# Patient Record
Sex: FEMALE | Race: WHITE
[De-identification: names, ages, dates, MRNs, and addresses within clinical notes are randomized per-mention and may not be internally consistent; named-entity substitution may affect disease eponyms.]

---

## 2018-03-22 ENCOUNTER — HOSPITAL ENCOUNTER (INPATIENT)
Dept: HOSPITAL 12 - ER | Age: 53
LOS: 3 days | Discharge: NURSING, REHAB OR LONG-TERM CARE | DRG: 282 | End: 2018-03-25
Payer: COMMERCIAL

## 2018-03-22 VITALS — DIASTOLIC BLOOD PRESSURE: 71 MMHG | SYSTOLIC BLOOD PRESSURE: 92 MMHG

## 2018-03-22 VITALS — BODY MASS INDEX: 16.69 KG/M2 | WEIGHT: 85 LBS | HEIGHT: 60 IN

## 2018-03-22 DIAGNOSIS — K85.90: Primary | ICD-10-CM

## 2018-03-22 DIAGNOSIS — S06.9X0S: ICD-10-CM

## 2018-03-22 DIAGNOSIS — Z93.1: ICD-10-CM

## 2018-03-22 DIAGNOSIS — G93.40: ICD-10-CM
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
## 2018-03-22 DIAGNOSIS — R13.10: ICD-10-CM

## 2018-03-22 LAB
ALP SERPL-CCNC: 88 U/L (ref 50–136)
ALT SERPL W/O P-5'-P-CCNC: 62 U/L (ref 14–59)
AST SERPL-CCNC: 42 U/L (ref 15–37)
BASOPHILS # BLD AUTO: 0 K/UL (ref 0–8)
BASOPHILS NFR BLD AUTO: 0.7 % (ref 0–2)
BILIRUB DIRECT SERPL-MCNC: 0.2 MG/DL (ref 0–0.2)
BILIRUB SERPL-MCNC: 1 MG/DL (ref 0.2–1)
BUN SERPL-MCNC: 14 MG/DL (ref 7–18)
CHLORIDE SERPL-SCNC: 102 MMOL/L (ref 98–107)
CO2 SERPL-SCNC: 28 MMOL/L (ref 21–32)
CREAT SERPL-MCNC: 0.6 MG/DL (ref 0.6–1.3)
EOSINOPHIL # BLD AUTO: 0 K/UL (ref 0–0.7)
EOSINOPHIL NFR BLD AUTO: 0.6 % (ref 0–7)
GLUCOSE SERPL-MCNC: 132 MG/DL (ref 74–106)
HCT VFR BLD AUTO: 36.8 % (ref 31.2–41.9)
HGB BLD-MCNC: 12.4 G/DL (ref 10.9–14.3)
LIPASE SERPL-CCNC: 456 U/L (ref 73–393)
LYMPHOCYTES # BLD AUTO: 1.4 K/UL (ref 20–40)
LYMPHOCYTES NFR BLD AUTO: 31.9 % (ref 20.5–51.5)
MCH RBC QN AUTO: 31.5 UUG (ref 24.7–32.8)
MCHC RBC AUTO-ENTMCNC: 34 G/DL (ref 32.3–35.6)
MCV RBC AUTO: 93.6 FL (ref 75.5–95.3)
MONOCYTES # BLD AUTO: 0.4 K/UL (ref 2–10)
MONOCYTES NFR BLD AUTO: 8.5 % (ref 0–11)
NEUTROPHILS # BLD AUTO: 2.5 K/UL (ref 1.8–8.9)
NEUTROPHILS NFR BLD AUTO: 58.3 % (ref 38.5–71.5)
PLATELET # BLD AUTO: 191 K/UL (ref 179–408)
POTASSIUM SERPL-SCNC: 3.6 MMOL/L (ref 3.5–5.1)
RBC # BLD AUTO: 3.94 MIL/UL (ref 3.63–4.92)
WBC # BLD AUTO: 4.3 K/UL (ref 3.8–11.8)
WS STN SPEC: 7.7 G/DL (ref 6.4–8.2)

## 2018-03-22 PROCEDURE — A4663 DIALYSIS BLOOD PRESSURE CUFF: HCPCS

## 2018-03-22 PROCEDURE — C9113 INJ PANTOPRAZOLE SODIUM, VIA: HCPCS

## 2018-03-22 RX ADMIN — SODIUM CHLORIDE PRN MLS/HR: 0.9 INJECTION, SOLUTION INTRAVENOUS at 21:54

## 2018-03-22 NOTE — NUR
PT RECEIVED FROM ED, VIA [address]. A/OX1. NON-VERBAL. ORIENTED TO ROOM. V/S STABLE. SINUS 
RHYTHM 73 ON THE TELE MONITOR. IN NO ACUTE DISTRESS. NO S/S OF PAIN NOTED AT THIS TIME. IV 
INTACT AND PATENT, ON RFA AND RAC. ON RA, TOLERATING WELL. GTUBE INTACT AND PATENT, NO 
RESIDUAL NOTED. PT KEPT NPO. HOB ELEVATED. SAFETY MEASURES IMPLEMENTED. BED ALARM SET.  CALL 
LIGHT WITHIN REACH.

## 2018-03-23 VITALS — SYSTOLIC BLOOD PRESSURE: 92 MMHG | DIASTOLIC BLOOD PRESSURE: 62 MMHG

## 2018-03-23 VITALS — SYSTOLIC BLOOD PRESSURE: 92 MMHG | DIASTOLIC BLOOD PRESSURE: 51 MMHG

## 2018-03-23 VITALS — SYSTOLIC BLOOD PRESSURE: 91 MMHG | DIASTOLIC BLOOD PRESSURE: 58 MMHG

## 2018-03-23 VITALS — SYSTOLIC BLOOD PRESSURE: 92 MMHG | DIASTOLIC BLOOD PRESSURE: 53 MMHG

## 2018-03-23 VITALS — DIASTOLIC BLOOD PRESSURE: 54 MMHG | SYSTOLIC BLOOD PRESSURE: 99 MMHG

## 2018-03-23 VITALS — DIASTOLIC BLOOD PRESSURE: 52 MMHG | SYSTOLIC BLOOD PRESSURE: 99 MMHG

## 2018-03-23 LAB
ALP SERPL-CCNC: 76 U/L (ref 50–136)
ALT SERPL W/O P-5'-P-CCNC: 46 U/L (ref 14–59)
APPEARANCE UR: CLEAR
AST SERPL-CCNC: 23 U/L (ref 15–37)
BASOPHILS # BLD AUTO: 0 K/UL (ref 0–8)
BASOPHILS NFR BLD AUTO: 0.7 % (ref 0–2)
BILIRUB SERPL-MCNC: 1 MG/DL (ref 0.2–1)
BILIRUB UR QL STRIP: NEGATIVE
BUN SERPL-MCNC: 9 MG/DL (ref 7–18)
CHLORIDE SERPL-SCNC: 108 MMOL/L (ref 98–107)
CO2 SERPL-SCNC: 25 MMOL/L (ref 21–32)
COLOR UR: YELLOW
CREAT SERPL-MCNC: 0.6 MG/DL (ref 0.6–1.3)
DEPRECATED SQUAMOUS URNS QL MICRO: (no result) /HPF
EOSINOPHIL # BLD AUTO: 0.1 K/UL (ref 0–0.7)
EOSINOPHIL NFR BLD AUTO: 1 % (ref 0–7)
GLUCOSE SERPL-MCNC: 71 MG/DL (ref 74–106)
GLUCOSE UR STRIP-MCNC: NEGATIVE MG/DL
HCT VFR BLD AUTO: 34 % (ref 31.2–41.9)
HGB BLD-MCNC: 11.5 G/DL (ref 10.9–14.3)
HGB UR QL STRIP: NEGATIVE
KETONES UR STRIP-MCNC: (no result) MG/DL
LEUKOCYTE ESTERASE UR QL STRIP: NEGATIVE
LIPASE SERPL-CCNC: 389 U/L (ref 73–393)
LYMPHOCYTES # BLD AUTO: 2.4 K/UL (ref 20–40)
LYMPHOCYTES NFR BLD AUTO: 45.8 % (ref 20.5–51.5)
MAGNESIUM SERPL-MCNC: 2.1 MG/DL (ref 1.8–2.4)
MCH RBC QN AUTO: 31.8 UUG (ref 24.7–32.8)
MCHC RBC AUTO-ENTMCNC: 34 G/DL (ref 32.3–35.6)
MCV RBC AUTO: 94.4 FL (ref 75.5–95.3)
MONOCYTES # BLD AUTO: 0.3 K/UL (ref 2–10)
MONOCYTES NFR BLD AUTO: 5.7 % (ref 0–11)
NEUTROPHILS # BLD AUTO: 2.4 K/UL (ref 1.8–8.9)
NEUTROPHILS NFR BLD AUTO: 46.8 % (ref 38.5–71.5)
NITRITE UR QL STRIP: NEGATIVE
PH UR STRIP: 5.5 [PH] (ref 5–8)
PHOSPHATE SERPL-MCNC: 3.4 MG/DL (ref 2.5–4.9)
PLATELET # BLD AUTO: 159 K/UL (ref 179–408)
POTASSIUM SERPL-SCNC: 4 MMOL/L (ref 3.5–5.1)
PROT UR QL STRIP: NEGATIVE
RBC # BLD AUTO: 3.61 MIL/UL (ref 3.63–4.92)
SP GR UR STRIP: 1.01 (ref 1–1.03)
UROBILINOGEN UR STRIP-MCNC: 0.2 E.U./DL
WBC # BLD AUTO: 5.2 K/UL (ref 3.8–11.8)
WBC #/AREA URNS HPF: (no result) /HPF (ref 0–3)
WS STN SPEC: 6.5 G/DL (ref 6.4–8.2)

## 2018-03-23 RX ADMIN — SODIUM CHLORIDE PRN MLS/HR: 0.9 INJECTION, SOLUTION INTRAVENOUS at 18:42

## 2018-03-23 RX ADMIN — Medication PRN ML: at 18:42

## 2018-03-23 RX ADMIN — SODIUM CHLORIDE PRN MLS/HR: 0.9 INJECTION, SOLUTION INTRAVENOUS at 05:20

## 2018-03-23 NOTE — NUR
RECEIVED PATIENT AWAKE IN BED, SHE'S NON VERBAL AND UNABLE TO EXPRESS HER NEEDS BUT ABLE TO 
NOD WITH HER HEAD. PATIENT WITH NO S/S OF OF PAIN OR DISTRESS ON ASSESSMENT. SHE HAS A 
G-TUBE WHICH IS PATENT AND INTACT WITH NO RESIDUAL. G-TUBE SITE IS CLEAN WITH NO S/S OF 
INFECTION. SAFETY AND COMFORT MEASURES IN PLACE, CALL LIGHT LEFT WITHIN PATIENT'S REACH.

## 2018-03-23 NOTE — NUR
END OF SHIFT NOTES. PT SLEPT INTERMITTENTLY THROUGHOUT SHIFT. IN STABLE CONDITION. 58 SINUS 
ON THE TELE MONITOR. TOLERATED RA, WELL. AFEBRILE. GTUBE REMAINS INTACT AND PATENT, NO 
RESIDUAL NOTED. NPO MAINTAINED. HOB ELEVATED. ALL NEEDS ATTENDED. SAFETY MAINTAINED. CALL 
LIGHT WITHIN REACH.

## 2018-03-24 VITALS — SYSTOLIC BLOOD PRESSURE: 100 MMHG | DIASTOLIC BLOOD PRESSURE: 54 MMHG

## 2018-03-24 VITALS — DIASTOLIC BLOOD PRESSURE: 53 MMHG | SYSTOLIC BLOOD PRESSURE: 96 MMHG

## 2018-03-24 VITALS — DIASTOLIC BLOOD PRESSURE: 47 MMHG | SYSTOLIC BLOOD PRESSURE: 101 MMHG

## 2018-03-24 VITALS — SYSTOLIC BLOOD PRESSURE: 111 MMHG | DIASTOLIC BLOOD PRESSURE: 65 MMHG

## 2018-03-24 RX ADMIN — SODIUM CHLORIDE PRN MLS/HR: 0.9 INJECTION, SOLUTION INTRAVENOUS at 06:06

## 2018-03-24 RX ADMIN — SODIUM CHLORIDE PRN MLS/HR: 0.9 INJECTION, SOLUTION INTRAVENOUS at 23:27

## 2018-03-24 NOTE — NUR
HEMODYNAMIC STATUS STABLE  SAFETY MEASURE PROVIDED BED ALARM ON CALL LIGHT IN REACH    NO 
ACUTE DISTRESS

## 2018-03-24 NOTE — NUR
AWAKE NON VERBAL  NO SOB OR PAIN GT FEEDING WITH 1.5 NUTREN AT 40 ML/HR PIOTR WELL NO N/V HOB 
UP AT ALL TIME  ON ASPIRATION AND FALL PRECAUTION BED ALARM ON  AND CALL LIGHT IN REACH

## 2018-03-24 NOTE — NUR
RECEIVED PATIENT AWAKE IN BED. PATIENT IS ALERT TO SELF. APHASIC AND UNABLE TO SPEAK BUT 
PATIENT IS ABLE TO MAKE SIMPLE NEEDS KNOWN, FOR EXAMPLE, IF PATIENTS DIAPER IS WET, SHE WILL 
POINT TO THE DIAPER AND WHEN ASKED IF SHE NEEDS TO BE CHANGED, SHE WILL NOD HER HEAD YES. 
IVF INFUSING WELL TO RIGHT WRIST #22 GAUGE. GT FEEDING INFUSING WELL. ON ASPIRATION 
PRECAUTIONS. NO S/S OF ANY PAIN OR DISCOMFORT. NO FACIAL GRIMACE NOTED AND WHEN ASKED IF 
PAIN, PATIENT SHAKES HER HEAD NO. NO RESP. DISTRESS NOTED. BED ALARM ON. CALL LIGHT IN 
REACH. ALL NEEDS ATTENDED, WILL CONTINUE TO MONITOR.

## 2018-03-24 NOTE — NUR
FAMILY ELINOR WAS CALL AND ASK WHEN SHE WILL  PATIENT TO GO HOME TODAY   SHE STATE SHE 
WILL NOT TAKE CARE HER ANYMORE  AND WANT HER TO GO TO SNF INSTEAD OF GO HOME  CHARGE NURSE  
MONY WAS INFORM AND WILL F/U WITH  IN AM REGARDING D/C TO SNF

## 2018-03-24 NOTE — NUR
PATIENT SLEPT WELL THROUGH THE SHIFT. NO EVIDENCE OF PAIN OR DISTRESS NOTED ON THIS SHIFT.  
DRESSING TO G-TUBE SITE CHANGED. SAFETY AND COMFORT MEASURES IN PLACE. ASPIRATION 
PRECAUTIONS MAINTAINED AT ALL TIMES.NO SIGNIFICANT CHANGES IN STATUS, PATIENT RESTING 
COMFORTABLY, ALL NEEDS MET.

## 2018-03-25 VITALS — DIASTOLIC BLOOD PRESSURE: 61 MMHG | SYSTOLIC BLOOD PRESSURE: 98 MMHG

## 2018-03-25 VITALS — SYSTOLIC BLOOD PRESSURE: 90 MMHG | DIASTOLIC BLOOD PRESSURE: 49 MMHG

## 2018-03-25 RX ADMIN — SODIUM CHLORIDE PRN MLS/HR: 0.9 INJECTION, SOLUTION INTRAVENOUS at 05:41

## 2018-03-25 RX ADMIN — Medication PRN ML: at 05:51

## 2018-03-25 NOTE — NUR
D/C ORDERS RECEIVED NOTED AND CARRIED OUT,D/C INSTRUCTION AND EDUCATION GIVEN TO THE PT CARE 
GIVER FROM [facility],D/C [facility]YN PER MD ORDERS,PT LEFT THE FACILITY VIA PRIVATE CAR IN 
STABLE CONDITION,

## 2018-03-25 NOTE — NUR
PATIENT AWAKE IN BED. APPEARS RESTLESS AND UNABLE TO SLEEP. PATIENT GIVEN AMBIEN 5MG AND 
TYLENOL 650MG PRN VIA GT FOR SLEEP AND MILD DISCOMFORT. BED ALARM ON, ALL NEEDS ATTENDED. 
WILL CONTINUE TO MONITOR.

## 2018-03-25 NOTE — NUR
PATIENT AWAKE IN BED. SLEPT FOR A FEW HOURS AFTER AMBIEN WAS ADMINISTERED. PATIENT IS AWAKE 
IN BED. IVF INFUSING WELL TO RIGHT WRIST. GT FEEDING INFUSING WELL. NO RESIDUAL NOTED. NO 
S/S OF PAIN OR DISCOMFORT. NO RESP. DISTRESS NOTED. BED ALARM ON. CALL LIGHT IN REACH. ALL 
NEEDS ATTENDED. WILL CONTINUE TO MONITOR AND ASSESS.

## 2018-03-25 NOTE — NUR
PATIENT ASLEEP. NO RESP. DISTRESS NOTED. RESTING WELL. BED ALARM ON. ALL NEEDS ATTENDED. 
WILL CONTINUE TO MONITOR AND ASSESS.